# Patient Record
Sex: MALE | Race: BLACK OR AFRICAN AMERICAN | NOT HISPANIC OR LATINO | URBAN - METROPOLITAN AREA
[De-identification: names, ages, dates, MRNs, and addresses within clinical notes are randomized per-mention and may not be internally consistent; named-entity substitution may affect disease eponyms.]

---

## 2018-04-29 ENCOUNTER — EMERGENCY (EMERGENCY)
Facility: HOSPITAL | Age: 23
LOS: 0 days | Discharge: HOME | End: 2018-04-29
Attending: EMERGENCY MEDICINE
Payer: COMMERCIAL

## 2018-04-29 VITALS
OXYGEN SATURATION: 99 % | TEMPERATURE: 99 F | DIASTOLIC BLOOD PRESSURE: 62 MMHG | SYSTOLIC BLOOD PRESSURE: 115 MMHG | HEART RATE: 59 BPM | RESPIRATION RATE: 19 BRPM

## 2018-04-29 VITALS
TEMPERATURE: 99 F | HEART RATE: 60 BPM | DIASTOLIC BLOOD PRESSURE: 76 MMHG | OXYGEN SATURATION: 100 % | HEIGHT: 74 IN | RESPIRATION RATE: 17 BRPM | SYSTOLIC BLOOD PRESSURE: 133 MMHG | WEIGHT: 149.91 LBS

## 2018-04-29 DIAGNOSIS — J02.9 ACUTE PHARYNGITIS, UNSPECIFIED: ICD-10-CM

## 2018-04-29 DIAGNOSIS — R13.10 DYSPHAGIA, UNSPECIFIED: ICD-10-CM

## 2018-04-29 DIAGNOSIS — J02.0 STREPTOCOCCAL PHARYNGITIS: ICD-10-CM

## 2018-04-29 PROCEDURE — 99283 EMERGENCY DEPT VISIT LOW MDM: CPT | Mod: 25

## 2018-04-29 PROCEDURE — 70360 X-RAY EXAM OF NECK: CPT | Mod: 26

## 2018-04-29 RX ORDER — KETOROLAC TROMETHAMINE 30 MG/ML
30 SYRINGE (ML) INJECTION ONCE
Refills: 0 | Status: DISCONTINUED | OUTPATIENT
Start: 2018-04-29 | End: 2018-04-29

## 2018-04-29 RX ORDER — ACETAMINOPHEN 500 MG
2 TABLET ORAL
Qty: 40 | Refills: 0
Start: 2018-04-29 | End: 2018-05-03

## 2018-04-29 RX ORDER — PENICILLIN G BENZATHINE 1200000 [IU]/2ML
1.2 INJECTION, SUSPENSION INTRAMUSCULAR ONCE
Refills: 0 | Status: COMPLETED | OUTPATIENT
Start: 2018-04-29 | End: 2018-04-29

## 2018-04-29 RX ORDER — DEXAMETHASONE 0.5 MG/5ML
10 ELIXIR ORAL ONCE
Refills: 0 | Status: COMPLETED | OUTPATIENT
Start: 2018-04-29 | End: 2018-04-29

## 2018-04-29 RX ADMIN — Medication 30 MILLIGRAM(S): at 02:53

## 2018-04-29 RX ADMIN — PENICILLIN G BENZATHINE 1.2 MILLION UNIT(S): 1200000 INJECTION, SUSPENSION INTRAMUSCULAR at 02:56

## 2018-04-29 RX ADMIN — Medication 30 MILLIGRAM(S): at 03:57

## 2018-04-29 RX ADMIN — Medication 10 MILLIGRAM(S): at 02:53

## 2018-04-29 NOTE — ED PROVIDER NOTE - MEDICAL DECISION MAKING DETAILS
21 yo M with likely strep pharyngitis  -xray neck r/o soft tissue swelling, pen G, dex, toradol  -f/u results, reeval

## 2018-04-29 NOTE — ED ADULT NURSE NOTE - OBJECTIVE STATEMENT
Patient to the ED with complaint of worsening sore throat associated with difficulty swallowing and breathing, runny nose, L ear pain and swollen neck glands.  patient reported that symptoms began yesterday and has gotten worst, took 2 advils PTA to the ED at 12am.  Patient also presented to the D with low grade fever and chills.

## 2018-04-29 NOTE — ED PROVIDER NOTE - OBJECTIVE STATEMENT
23 yo M with sore throat for 2 days.  Pt. has pain with swallowing, pain is worse on L throat.  No sick contacts, no other complaints.   ROS: negative for fever, cough, headache, chest pain, shortness of breath, abd pain, nausea, vomiting, diarrhea, rash, paresthesia, and weakness.   PMH: negative; Meds: Denies; SH: Denies smoking/drinking/drug use

## 2018-04-29 NOTE — ED PROVIDER NOTE - PHYSICAL EXAMINATION
Vitals: WNL  Gen: AAOx3, NAD, sitting comfortably in stretcher  Head: ncat, perrla, eomi b/l  ENT: TMI b/l, throat has 2+ tonsillar swelling, exudate on L, no uvular deviation, patent airway  Neck: supple, + cervical lymphadenopathy, greater on L, no midline deviation  Heart: rrr, no m/r/g  Lungs: CTA b/l, no rales/ronchi/wheezes  Abd: soft, nontender, non-distended, no rebound or guarding  Ext: no clubbing/cyanosis/edema  Neuro: sensation and muscle strength intact b/l, steady gait

## 2021-04-29 ENCOUNTER — EMERGENCY (EMERGENCY)
Facility: HOSPITAL | Age: 26
LOS: 0 days | Discharge: HOME | End: 2021-04-29
Payer: COMMERCIAL

## 2021-04-29 VITALS
SYSTOLIC BLOOD PRESSURE: 136 MMHG | OXYGEN SATURATION: 98 % | DIASTOLIC BLOOD PRESSURE: 63 MMHG | TEMPERATURE: 98 F | HEART RATE: 55 BPM | RESPIRATION RATE: 16 BRPM | WEIGHT: 179.9 LBS | HEIGHT: 74 IN

## 2021-04-29 DIAGNOSIS — R07.81 PLEURODYNIA: ICD-10-CM

## 2021-04-29 PROCEDURE — 71101 X-RAY EXAM UNILAT RIBS/CHEST: CPT | Mod: 26,RT

## 2021-04-29 PROCEDURE — 99284 EMERGENCY DEPT VISIT MOD MDM: CPT

## 2021-04-29 RX ORDER — IBUPROFEN 200 MG
600 TABLET ORAL ONCE
Refills: 0 | Status: COMPLETED | OUTPATIENT
Start: 2021-04-29 | End: 2021-04-29

## 2021-04-29 RX ORDER — IBUPROFEN 200 MG
2 TABLET ORAL
Qty: 56 | Refills: 0
Start: 2021-04-29 | End: 2021-05-05

## 2021-04-29 RX ORDER — METHOCARBAMOL 500 MG/1
1 TABLET, FILM COATED ORAL
Qty: 9 | Refills: 0
Start: 2021-04-29 | End: 2021-05-01

## 2021-04-29 RX ADMIN — Medication 600 MILLIGRAM(S): at 20:26

## 2021-04-29 NOTE — ED PROVIDER NOTE - RESPIRATORY, MLM
Breath sounds clear and equal bilaterally. pt states she lives with family in a private home with 4 steps to enter, first floor setup. Pt states prior to admission being independent with all functional mobility and ADLs. pt states she owns a RW and straight cane. pt reports ambulating without a device at times.

## 2021-04-29 NOTE — ED PROVIDER NOTE - NSFOLLOWUPCLINICS_GEN_ALL_ED_FT
[facility]  Orthopedic Surgery  [address] [address]  Phone: [phone]  Fax:   Follow Up Time: 7-10 Days

## 2021-04-29 NOTE — ED PROVIDER NOTE - NSFOLLOWUPINSTRUCTIONS_ED_ALL_ED_FT
Please schedule to follow up within 7 days for rib pain     [facility]  Orthopedic Surgery  [address] [address]  Phone: [phone]  Fax:   Follow Up Time: 7-10 Days    Please take motrin 400 mg with meals every 8 hours as needed for pain for 7 days  please take robaxin 500 mg 3 times daily for 3 days for muscle pain   please take clindamycin 450 mg 3 times daily for 10 days  DO NOT DRIVE OR OPERATE HEAVY MACHINERY IF TAKING ROBAXIN. IT CAUSES DROWSINESS     please return to the ED for worst symptoms chest pain, short of breath, fever, chills, cough, hemoptysis, numbness, weakness, drooling, blurry vision, headache, dizziness, bleeding, dysuria, frequency, urgency, rash, lesion, fall, injury, trauma, paresthesia, swelling, pain, discoloration, Please schedule to follow up within 7 days for rib pain     [facility]  Orthopedic Surgery  [address] [address]  Phone: [phone]  Fax:   Follow Up Time: 7-10 Days    Please take motrin 400 mg with meals every 8 hours as needed for pain for 7 days  please take robaxin 500 mg 3 times daily for 3 days for muscle pain   DO NOT DRIVE OR OPERATE HEAVY MACHINERY IF TAKING ROBAXIN. IT CAUSES DROWSINESS     please return to the ED for worst symptoms chest pain, short of breath, fever, chills, cough, hemoptysis, numbness, weakness, drooling, blurry vision, headache, dizziness, bleeding, dysuria, frequency, urgency, rash, lesion, fall, injury, trauma, paresthesia, swelling, pain, discoloration,

## 2021-04-29 NOTE — ED ADULT NURSE NOTE - OBJECTIVE STATEMENT
26 y/o M came to ed with right rib pain while playing football. Patient stated he heard a clicking sound and difficulty breath when walking. No PHM

## 2021-04-29 NOTE — ED PROVIDER NOTE - OBJECTIVE STATEMENT
26 y/o male with no PMhx no on meds presented to the ED with complaint of intermittent sharp right rib pain x 1 days post being tackle while playing football, no radiation, worst with deep breathing, no alleviating factors, denies taking motrin / tylenol. Denies bleeding, hemoptysis, cough, bruise, sob, cp, palpitation, loc, syncope,

## 2021-04-29 NOTE — ED PROVIDER NOTE - PATIENT PORTAL LINK FT
You can access the FollowMyHealth Patient Portal offered by Health system by registering at the following website: [email or web address]. By joining [employer]’s FollowMyHealth portal, you will also be able to view your health information using other applications (apps) compatible with our system.

## 2021-04-29 NOTE — ED PROVIDER NOTE - CLINICAL SUMMARY MEDICAL DECISION MAKING FREE TEXT BOX
26 y/o male with no PMhx no on meds presented to the ED with complaint of intermittent sharp right rib pain x 1 days post being tackle while playing football    imp : rib fracture vs MSK pain    plan   motrin   ortho referral   rest, ice, warm complex   patient education

## 2021-04-29 NOTE — ED PROVIDER NOTE - PROGRESS NOTE DETAILS
AAox3 non toxic looking afebrile sitting in chair in NAD. motrin given for pain. Dx right rib negative for fx or dislocation. discussed with radiologist. patient remains asymptomatic. patient has no complaint at this time no fever, chills, cp, palpitation, sob, bleeding, paresthesia, numbness, weakness, loc, syncope, ha, dizziness. patient educated to follow up within 7 days with referral as instructed on the discharge paper. patient instructed to return to the ED for worsening symptoms. patient verbalized understanding and agrees with plan. patient stable to be discharge home.

## 2021-04-29 NOTE — ED PROVIDER NOTE - PHYSICAL EXAMINATION
lungs : vesicular breath sound, CTAB b/l no w/r/r  chest : no bruise, no deformity, no swelling, chest expending equally b/l   right rib : generalized TTP right ribs, no deformity, skin intact, no contusion

## 2023-04-16 ENCOUNTER — EMERGENCY (EMERGENCY)
Facility: HOSPITAL | Age: 28
LOS: 0 days | Discharge: HOME | End: 2023-04-16
Payer: COMMERCIAL

## 2023-04-16 VITALS
RESPIRATION RATE: 17 BRPM | HEART RATE: 75 BPM | DIASTOLIC BLOOD PRESSURE: 79 MMHG | SYSTOLIC BLOOD PRESSURE: 122 MMHG | OXYGEN SATURATION: 97 % | TEMPERATURE: 99 F

## 2023-04-16 VITALS
TEMPERATURE: 99 F | WEIGHT: 154.98 LBS | SYSTOLIC BLOOD PRESSURE: 129 MMHG | RESPIRATION RATE: 18 BRPM | HEART RATE: 94 BPM | HEIGHT: 73 IN | DIASTOLIC BLOOD PRESSURE: 84 MMHG | OXYGEN SATURATION: 98 %

## 2023-04-16 DIAGNOSIS — N50.89 OTHER SPECIFIED DISORDERS OF THE MALE GENITAL ORGANS: ICD-10-CM

## 2023-04-16 DIAGNOSIS — R31.9 HEMATURIA, UNSPECIFIED: ICD-10-CM

## 2023-04-16 DIAGNOSIS — N50.812 LEFT TESTICULAR PAIN: ICD-10-CM

## 2023-04-16 DIAGNOSIS — I86.1 SCROTAL VARICES: ICD-10-CM

## 2023-04-16 LAB
APPEARANCE UR: CLEAR — SIGNIFICANT CHANGE UP
BACTERIA # UR AUTO: ABNORMAL
BILIRUB UR-MCNC: NEGATIVE — SIGNIFICANT CHANGE UP
COLOR SPEC: YELLOW — SIGNIFICANT CHANGE UP
DIFF PNL FLD: ABNORMAL
EPI CELLS # UR: SIGNIFICANT CHANGE UP
GLUCOSE UR QL: NEGATIVE MG/DL — SIGNIFICANT CHANGE UP
KETONES UR-MCNC: ABNORMAL
LEUKOCYTE ESTERASE UR-ACNC: ABNORMAL
NITRITE UR-MCNC: NEGATIVE — SIGNIFICANT CHANGE UP
PH UR: 7 — SIGNIFICANT CHANGE UP (ref 5–8)
PROT UR-MCNC: 30 MG/DL
RBC CASTS # UR COMP ASSIST: SIGNIFICANT CHANGE UP /HPF (ref 0–4)
SP GR SPEC: 1.01 — SIGNIFICANT CHANGE UP (ref 1.01–1.02)
UROBILINOGEN FLD QL: 4 MG/DL
WBC UR QL: SIGNIFICANT CHANGE UP

## 2023-04-16 PROCEDURE — 76870 US EXAM SCROTUM: CPT | Mod: 26

## 2023-04-16 PROCEDURE — 99284 EMERGENCY DEPT VISIT MOD MDM: CPT

## 2023-04-16 NOTE — ED PROVIDER NOTE - PATIENT PORTAL LINK FT
You can access the FollowMyHealth Patient Portal offered by [facility] by registering at the following website: [email or web address]. By joining [employer]’s FollowMyHealth portal, you will also be able to view your health information using other applications (apps) compatible with our system.

## 2023-04-16 NOTE — ED PROVIDER NOTE - CARE PROVIDER_API CALL
Venice Napier)  Urology  [address] [place] [address]  Phone: [phone]  Fax: [phone]  Follow Up Time: 1-3 Days    your pmd in 1-3 days,   Phone: (   )    -  Fax: (   )    -  Follow Up Time:

## 2023-04-16 NOTE — ED PROVIDER NOTE - OBJECTIVE STATEMENT
27-year-old male without PMH presents with moderate constant throbbing non-radiating left testicular pain x 6 months, worse in the last few days.  + Better with urinating, worse with holding in urine.  No abdominal pain, back pain, chest pain, shortness of breath, fever, nausea, vomiting, diarrhea, concern for STDs, new sexual partners.

## 2023-04-16 NOTE — ED PROVIDER NOTE - NS ED ROS FT
Review of Systems    Constitutional: (-) fever   Eyes/ENT: (-) vision changes  Cardiovascular: (-) chest pain, (-) syncope (-) palpitations  Respiratory: (-) cough, (-) shortness of breath  Gastrointestinal: (-) vomiting, (-) diarrhea (-) abdominal pain  Genitourinary:  (-) dysuria  see hpi  Musculoskeletal: (-) neck pain, (-) back pain, (-) leg pain/swelling  Integumentary: (-) rash, (-) edema  Neurological: (-) headache, (-) confusion  Hematologic: (-) easy bruising

## 2023-04-16 NOTE — ED PROVIDER NOTE - PHYSICAL EXAMINATION
PHYSICAL EXAM:    GENERAL: Alert, appears stated age, well appearing, non-toxic  SKIN: Warm, pink and dry. MMM.   HEAD: NC, AT, no step offs   EYE: Normal lids/conjunctiva, PERRL, EOMI  ENT: Normal hearing, patent oropharynx without erythema or exudate, TMs clear b/l. uvula midline  NECK: +supple. No meningismus, or JVD, +Trachea midline. no tenderness/step offs.   Pulm: Bilateral BS, normal resp effort, no wheezes, stridor, or retractions  CV: RRR, no M/R/G, 2+and = radial pulses  Abd: soft, non-tender, non-distended, no rebound/guarding. no CVA tenderness.   : external genitalia WNL and without lesions. testicles descended bilaterally. +cremasteric reflexes bilaterally. no hernias on valsalva. testicles non tender to palpation. no discharge. +L testicule with small varicocele which engorges on valsalva. Chaperoned by Dr. Cris Smith: no erythema, cyanosis, edema. no calf tenderness  Neuro: AAOx3, normal gait.

## 2023-04-16 NOTE — ED ADULT NURSE NOTE - OBJECTIVE STATEMENT
Patient c/o testicular pain X 6 months, worsening today.   Denies lower abd pain, penile discharge or dysuria.

## 2023-04-16 NOTE — ED PROVIDER NOTE - PROVIDER TOKENS
PROVIDER:[TOKEN:[[id number]],FOLLOWUP:[1-3 Days]],FREE:[LAST:[your pmd in 1-3 days],PHONE:[(   )    -],FAX:[(   )    -]]

## 2023-04-16 NOTE — ED PROVIDER NOTE - CARE PLAN
Principal Discharge DX:	Varicocele  Secondary Diagnosis:	Testicular pain  Secondary Diagnosis:	Hematuria   1

## 2023-04-16 NOTE — ED ADULT TRIAGE NOTE - CHIEF COMPLAINT QUOTE
pt c/o pain and swelling in left testicle for 6 months, worst today. sent to the ed by urgent care to be evaluated. no history.

## 2023-04-17 PROBLEM — Z00.00 ENCOUNTER FOR PREVENTIVE HEALTH EXAMINATION: Status: ACTIVE | Noted: 2023-04-17

## 2023-04-17 LAB
C TRACH RRNA SPEC QL NAA+PROBE: SIGNIFICANT CHANGE UP
C TRACH+GC RRNA SPEC QL PROBE: SIGNIFICANT CHANGE UP
CULTURE RESULTS: SIGNIFICANT CHANGE UP
N GONORRHOEA RRNA SPEC QL NAA+PROBE: SIGNIFICANT CHANGE UP
SPECIMEN SOURCE: SIGNIFICANT CHANGE UP
T VAGINALIS RRNA SPEC QL NAA+PROBE: SIGNIFICANT CHANGE UP

## 2023-04-18 ENCOUNTER — APPOINTMENT (OUTPATIENT)
Dept: UROLOGY | Facility: CLINIC | Age: 28
End: 2023-04-18

## 2025-02-06 NOTE — ED PROVIDER NOTE - PROGRESS NOTE DETAILS
Results reported to patient--grossly benign, xr normal  Pt. reports feeling better after meds  pt. agrees to f/u with primary care outpt. as well as ENT  pt. understands to return to ED if symptoms worsen; will d/c
Initial (On Arrival)